# Patient Record
(demographics unavailable — no encounter records)

---

## 2025-01-31 NOTE — HISTORY OF PRESENT ILLNESS
[FreeTextEntry1] : Lisa (she/her) is a 33yo male to female transgender patient with a history of gender dysphoria. She seeks consultation for facial feminization surgery. She reports that she has been socially transitioning for 10 years and medical transitioning for 20 months. She endorses a significant mental health history of depression with one 2 week psychiatric hospitalization in 2011, stable now. She has no past surgical history. She is followedby her PCP Millicent Vega NP for gender affirmation care. She denies nicotine, alcohol, and marijuana use. Patient denies a history of previous gender affirming surgeries and gender affirming procedures such as Botox, silicone, fillers, liposuction and fat grafting. Patient denies personal and family medical history of clots, strokes, bleeding disorders and anesthesia problems. She reports that the male appearance of her face exacerbates her gender dysphoria and cause misgendering.  The areas contributing the most to her dysphoria are currently her jaw and chin.

## 2025-01-31 NOTE — ASSESSMENT
[FreeTextEntry1] : PE:Const: Awake, alert, oriented  Eyes: EOMI. sclera without erythema or injection  Resp: Even , unlabored. no increased WOB. No cough appreciated  Neck: no JVD  Neuro: Nl Tone  Psych: Nl Affect  HEENT:  elongated forehead   receding hairline   Supraorbital fullness   convex frontal sinus    masculine zygoma shape   widened, full chin   boxy wide chin   Hypoplastic cheeks   prominent tracheal bulge ("Apolinar's Apple")   Plan for medically necessary facial feminization to alleviate symptoms of gender dysphoria  plan:  3dct scan for virtual surgical planning   forehead reduction, pretrichial cpt 44473 (Forehead reduction, pretrichial): Previous exposure to elevated testosterone and dihydrotestosterone results in hairline recession and decreased hair fullness in the frontotemporal region creating a masculine M shaped hairline. This also results in a vertically longer and more masculine hairline. These highly visible features significantly exacerbate feelings of gender dysphoria. Forehead reduction ameliorates both of these issues and contributes significantly to feminine facial appearance.  90153 (Brow lift): A low-set brow is considered a masculine facial feature. Feminine eyes tend to appear larger than masculine eyes because the brows are slightly higher and more arched. Lifting the brow increases the distance between the eyelid and brow line giving a more classically feminine appearance.  43849 (Frontal sinus setback): Previous exposure to testosterone has led this patient to have a masculine forehead with prominent frontal bossing in the frontal sinus area. A feminine forehead is flat from a profile view. The frontal sinus setback procedure reshapes this area by pushing back the bone and changing the contour from convex to flat. A less prominent frontal sinus and brow bone area is more consistent with a feminine face.  41240 (Supra orbital reduction): The bone growth that has occurred as a result of testosterone exposure has created prominent supraorbital rims. Contouring and reducing the supraorbital bony protrusion will help to eliminate his characteristically masculine facial feature.  76096 (Temporal area reduction): On physical exam this patient has lateral orbital hooding or overhang of the lateral frontal bone which is typically associated with a masculine skull and orbits by eliminating this overhang and excess bone brow lift can also be successfully performed, making the upper face appear more characteristically feminine.  fat grafting to zygoma, staged procedure - cpt 60041 (Fat grafting to zygoma): Prolonged testosterone exposure has resulted in depressed soft tissue in the cheek region. Feminine facial contours can be achieved with autologous fat grafting to alter the size and shape of the cheeks. Feminine cheeks have greater fat deposit in the "apple" region of the cheeks. Fat grafting to the cheek area will restore volume to the hypoplastic malar regions. It allows for more precise sculpting than skeletal surgery alone. 50- 70% take of the fat grafting is anticipated in this region which is a wide range, and this usually needs to be performed as a staged procedure.    genioplasty, reductive cpt 14806 (Reductive genioplasty): In general, masculine faces have larger wider chins than feminine faces. Reductive genioplasty will narrow and shorten the chin giving it a slender, softer, feminine appearance.    Refer to Dr. HAHN for open rhinoplasty  septoplasty tracheoplasty hair grafting/hair restoration, staged procedure    We will need two letters of support in accordance with [place] Medicaid guidelines. Patient will work on obtaining these from PCP/mental health provider/endocrinologist.  I, Dr. Kingston, personally performed the evaluation and management (E/M) services for this new patient. That E/M includes conducting the clinically appropriate initial history &/or exam, assessing all conditions, and establishing the plan of care. Today, my KEYLA, Jovanny Toscano PA-C, was here to observe my evaluation and management service for this patient & follow plan of care established by me going forward.

## 2025-01-31 NOTE — ASSESSMENT
[FreeTextEntry1] : PE:Const: Awake, alert, oriented  Eyes: EOMI. sclera without erythema or injection  Resp: Even , unlabored. no increased WOB. No cough appreciated  Neck: no JVD  Neuro: Nl Tone  Psych: Nl Affect  HEENT:  elongated forehead   receding hairline   Supraorbital fullness   convex frontal sinus    masculine zygoma shape   widened, full chin   boxy wide chin   Hypoplastic cheeks   prominent tracheal bulge ("Paolinar's Apple")   Plan for medically necessary facial feminization to alleviate symptoms of gender dysphoria  plan:  3dct scan for virtual surgical planning   forehead reduction, pretrichial cpt 04709 (Forehead reduction, pretrichial): Previous exposure to elevated testosterone and dihydrotestosterone results in hairline recession and decreased hair fullness in the frontotemporal region creating a masculine M shaped hairline. This also results in a vertically longer and more masculine hairline. These highly visible features significantly exacerbate feelings of gender dysphoria. Forehead reduction ameliorates both of these issues and contributes significantly to feminine facial appearance.  18989 (Brow lift): A low-set brow is considered a masculine facial feature. Feminine eyes tend to appear larger than masculine eyes because the brows are slightly higher and more arched. Lifting the brow increases the distance between the eyelid and brow line giving a more classically feminine appearance.  56933 (Frontal sinus setback): Previous exposure to testosterone has led this patient to have a masculine forehead with prominent frontal bossing in the frontal sinus area. A feminine forehead is flat from a profile view. The frontal sinus setback procedure reshapes this area by pushing back the bone and changing the contour from convex to flat. A less prominent frontal sinus and brow bone area is more consistent with a feminine face.  15949 (Supra orbital reduction): The bone growth that has occurred as a result of testosterone exposure has created prominent supraorbital rims. Contouring and reducing the supraorbital bony protrusion will help to eliminate his characteristically masculine facial feature.  66779 (Temporal area reduction): On physical exam this patient has lateral orbital hooding or overhang of the lateral frontal bone which is typically associated with a masculine skull and orbits by eliminating this overhang and excess bone brow lift can also be successfully performed, making the upper face appear more characteristically feminine.  fat grafting to zygoma, staged procedure - cpt 68299 (Fat grafting to zygoma): Prolonged testosterone exposure has resulted in depressed soft tissue in the cheek region. Feminine facial contours can be achieved with autologous fat grafting to alter the size and shape of the cheeks. Feminine cheeks have greater fat deposit in the "apple" region of the cheeks. Fat grafting to the cheek area will restore volume to the hypoplastic malar regions. It allows for more precise sculpting than skeletal surgery alone. 50- 70% take of the fat grafting is anticipated in this region which is a wide range, and this usually needs to be performed as a staged procedure.    genioplasty, reductive cpt 49270 (Reductive genioplasty): In general, masculine faces have larger wider chins than feminine faces. Reductive genioplasty will narrow and shorten the chin giving it a slender, softer, feminine appearance.    Refer to Dr. HAHN for open rhinoplasty  septoplasty tracheoplasty hair grafting/hair restoration, staged procedure    We will need two letters of support in accordance with [place] Medicaid guidelines. Patient will work on obtaining these from PCP/mental health provider/endocrinologist.  I, Dr. Kingston, personally performed the evaluation and management (E/M) services for this new patient. That E/M includes conducting the clinically appropriate initial history &/or exam, assessing all conditions, and establishing the plan of care. Today, my KEYLA, Jovanny Toscano PA-C, was here to observe my evaluation and management service for this patient & follow plan of care established by me going forward.

## 2025-01-31 NOTE — HISTORY OF PRESENT ILLNESS
[FreeTextEntry1] : Lisa (she/her) is a 31yo male to female transgender patient with a history of gender dysphoria. She seeks consultation for facial feminization surgery. She reports that she has been socially transitioning for 10 years and medical transitioning for 20 months. She endorses a significant mental health history of depression with one 2 week psychiatric hospitalization in 2011, stable now. She has no past surgical history. She is followedby her PCP Millicent Vega NP for gender affirmation care. She denies nicotine, alcohol, and marijuana use. Patient denies a history of previous gender affirming surgeries and gender affirming procedures such as Botox, silicone, fillers, liposuction and fat grafting. Patient denies personal and family medical history of clots, strokes, bleeding disorders and anesthesia problems. She reports that the male appearance of her face exacerbates her gender dysphoria and cause misgendering.  The areas contributing the most to her dysphoria are currently her jaw and chin.